# Patient Record
Sex: FEMALE | Race: WHITE | Employment: OTHER | URBAN - METROPOLITAN AREA
[De-identification: names, ages, dates, MRNs, and addresses within clinical notes are randomized per-mention and may not be internally consistent; named-entity substitution may affect disease eponyms.]

---

## 2017-08-17 ENCOUNTER — TELEPHONE (OUTPATIENT)
Dept: DERMATOLOGY | Facility: AMBULATORY SURGERY CENTER | Age: 77
End: 2017-08-17

## 2017-08-17 NOTE — TELEPHONE ENCOUNTER
Patient Appointment Date: 8/31/17      Janet Pete, 78 y.o., female  Is calling for their Mohs Pre-Op Assessment    does not have Hepatitis C or HI  does confirm site   does ID site. Allergies: Allergies not on file      does not have a Pacemaker  does not have a Defibrillator    does not need antibiotics    is not taking NSAIDs    is not taking aspirin    is not taking garlic  is not taking ginkgo  is not taking Ginseng  is not taking fish oils  is not taking vit E    does not take a blood thinner    is not taking Coumadin/Warfarin     Pre operative assessment questions asked to patient. Patient has a general understanding of the procedure, and has been versed that there will be local anesthesia used in the procedure and that She will be ok to drive themselves to and from the appointment.

## 2017-08-31 ENCOUNTER — OFFICE VISIT (OUTPATIENT)
Dept: DERMATOLOGY | Facility: AMBULATORY SURGERY CENTER | Age: 77
End: 2017-08-31

## 2017-08-31 VITALS
SYSTOLIC BLOOD PRESSURE: 128 MMHG | HEIGHT: 70 IN | RESPIRATION RATE: 20 BRPM | HEART RATE: 88 BPM | TEMPERATURE: 97 F | WEIGHT: 175 LBS | DIASTOLIC BLOOD PRESSURE: 72 MMHG | BODY MASS INDEX: 25.05 KG/M2 | OXYGEN SATURATION: 95 %

## 2017-08-31 DIAGNOSIS — C44.311 BASAL CELL CARCINOMA OF LEFT NASAL TIP: Primary | ICD-10-CM

## 2017-08-31 RX ORDER — LIDOCAINE HYDROCHLORIDE AND EPINEPHRINE 20; 5 MG/ML; UG/ML
2.5 INJECTION, SOLUTION EPIDURAL; INFILTRATION; INTRACAUDAL; PERINEURAL ONCE
Qty: 2.5 ML | Refills: 0
Start: 2017-08-31 | End: 2017-08-31

## 2017-08-31 RX ORDER — BUPIVACAINE HYDROCHLORIDE AND EPINEPHRINE 2.5; 5 MG/ML; UG/ML
INJECTION, SOLUTION INFILTRATION; PERINEURAL
Qty: 1.5 ML | Refills: 0
Start: 2017-08-31

## 2017-08-31 NOTE — MR AVS SNAPSHOT
Visit Information Date & Time Provider Department Dept. Phone Encounter #  
 8/31/2017 10:30 AM Stephanie Wilkins MD [facility] [id number] Your Appointments 10/4/2017  8:45 AM  
SURGERY with Stephanie Wilkins MD  
[facility]) Appt Note: NP BCC left nasal tip Dr. Gisell Mauro mailed ppw  
 [facility] [place] [address]  
[address] [place] [address] [address] [place] [address] Upcoming Health Maintenance Date Due DTaP/Tdap/Td series (1 - Tdap) 12/8/1963 FOBT Q 1 YEAR AGE 50-75 12/8/1992 ZOSTER VACCINE AGE 60> 10/8/2002 GLAUCOMA SCREENING Q2Y 12/8/2007 OSTEOPOROSIS SCREENING (DEXA) 12/8/2007 Pneumococcal 65+ Low/Medium Risk (1 of 2 - PCV13) 12/8/2007 MEDICARE YEARLY EXAM 12/8/2007 INFLUENZA AGE 9 TO ADULT 8/1/2017 Allergies as of 8/31/2017  Never Reviewed Not on File Current Immunizations  Never Reviewed No immunizations on file. Not reviewed this visit You Were Diagnosed With   
  
 Codes Comments Basal cell carcinoma of left nasal tip    -  Primary ICD-10-CM: C44.311 ICD-9-CM: 173.31 Vitals BP Pulse Temp Resp Height(growth percentile) Weight(growth percentile) 128/72 (BP 1 Location: Left arm, BP Patient Position: Sitting) 88 97 °F (36.1 °C) (Oral) 20 5' 10\" (1.778 m) 175 lb (79.4 kg) SpO2 BMI Smoking Status 95% 25.11 kg/m2 Never Assessed BMI and BSA Data Body Mass Index Body Surface Area  
 25.11 kg/m 2 1.98 m 2 Your Updated Medication List  
  
Notice  As of 8/31/2017 11:17 AM  
 You have not been prescribed any medications. Patient Instructions WOUND CARE INSTRUCTIONS 1. Keep the dressing clean and dry and do not remove for 48 hours. 2. Then change the dressing once a day as follows: a. Wash hands before and after each dressing change. b. Remove dressing and wash site gently with mild soap and water, rinse, and pat dry. 
c. Apply an ointment (Bacitracin, Polysporin, Neosporin, Petroleum jelly or Aquaphor). d. Apply a non-stick (Telfa) dressing or Band-Aid to cover the wound. Remove pressure bandage on Saturday, then wash gently and apply Vaseline and a band-aid to site daily for 1 week. 3. Watch for: BLEEDING: A small amount of drainage may occur. If bleeding occurs, elevate and rest the surgery site. Apply gauze and steady pressure for 15 minutes. If bleeding continues, call this office. INFECTION: Signs of infection include increased redness, pain, warmth, drainage of pus, and fever. If this occurs, call this office. 4. Special Instructions (follow any that are checked): ·  You have stitches that need to be removed in 0 days ·  Avoid bending at the waist and heavy lifting for two days. ·  Sleep with your head elevated for the next two nights. ·  Rest the surgery site and keep it elevated as much as possible for two days. ·  You may apply an ice-pack for 10-15 minutes every waking hour for the rest of the day. ·  Eat a soft diet and avoid hot food and hot drinks for the rest of the day. ·  Other instructions: Follow up as directed Take Tylenol for pain as needed. Once the site is healed with no remaining bandages or open areas, protect your surgical site and scar from the sun, as this area will be more sensitive. Use a broad spectrum sunscreen SPF 30 or higher daily, and a chemical free product (one containing zinc oxide or titanium dioxide) is a good choice if the area is sensitive. You may begin to gently massage the surgical site in 2-3 weeks, rubbing in a circular motion along the scar. This can help reduce swelling and thickness of a scar. A scar cream may be used beginnning 1 month after the surgery. If you have any questions or concerns, please call our office Monday through Friday at [phone]. Introducing [facility]! [facility] introduces [employer] patient portal. Now you can access parts of your medical record, email your doctor's office, and request medication refills online. 1. In your internet browser, go to [email or web address] 2. Click on the First Time User? Click Here link in the Sign In box. You will see the New Member Sign Up page. 3. Enter your [employer] Access Code exactly as it appears below. You will not need to use this code after youve completed the sign-up process. If you do not sign up before the expiration date, you must request a new code. · [employer] Access Code: [email or web address] Expires: 11/29/2017 11:16 AM 
 
4. Enter the last four digits of your Social Security Number (xxxx) and Date of Birth (mm/dd/yyyy) as indicated and click Submit. You will be taken to the next sign-up page. 5. Create a [employer] ID. This will be your [employer] login ID and cannot be changed, so think of one that is secure and easy to remember. 6. Create a [employer] password. You can change your password at any time. 7. Enter your Password Reset Question and Answer. This can be used at a later time if you forget your password. 8. Enter your e-mail address. You will receive e-mail notification when new information is available in [address]. 9. Click Sign Up. You can now view and download portions of your medical record. 10. Click the Download Summary menu link to download a portable copy of your medical information. If you have questions, please visit the Frequently Asked Questions section of the [employer] website. Remember, [employer] is NOT to be used for urgent needs. For medical emergencies, dial 911. Now available from your iPhone and Android! Please provide this summary of care documentation to your next provider. If you have any questions after today's visit, please call [phone].

## 2017-08-31 NOTE — PATIENT INSTRUCTIONS
WOUND CARE INSTRUCTIONS    1. Keep the dressing clean and dry and do not remove for 48 hours. 2. Then change the dressing once a day as follows:  a. Wash hands before and after each dressing change. b. Remove dressing and wash site gently with mild soap and water, rinse, and pat dry.  c. Apply an ointment (Bacitracin, Polysporin, Neosporin, Petroleum jelly or Aquaphor). d. Apply a non-stick (Telfa) dressing or Band-Aid to cover the wound. Remove pressure bandage on Saturday, then wash gently and apply Vaseline and a band-aid to site daily for 1 week. 3. Watch for:  BLEEDING: A small amount of drainage may occur. If bleeding occurs, elevate and rest the surgery site. Apply gauze and steady pressure for 15 minutes. If bleeding continues, call this office. INFECTION: Signs of infection include increased redness, pain, warmth, drainage of pus, and fever. If this occurs, call this office. 4. Special Instructions (follow any that are checked):  · [] You have stitches that need to be removed in 0 days  · [x] Avoid bending at the waist and heavy lifting for two days. · [x] Sleep with your head elevated for the next two nights. · [x] Rest the surgery site and keep it elevated as much as possible for two days. · [x] You may apply an ice-pack for 10-15 minutes every waking hour for the rest of the day. · [] Eat a soft diet and avoid hot food and hot drinks for the rest of the day. · [] Other instructions: Follow up as directed  Take Tylenol for pain as needed. Once the site is healed with no remaining bandages or open areas, protect your surgical site and scar from the sun, as this area will be more sensitive. Use a broad spectrum sunscreen SPF 30 or higher daily, and a chemical free product (one containing zinc oxide or titanium dioxide) is a good choice if the area is sensitive. You may begin to gently massage the surgical site in 2-3 weeks, rubbing in a circular motion along the scar.  This can help reduce swelling and thickness of a scar. A scar cream may be used beginnning 1 month after the surgery. If you have any questions or concerns, please call our office Monday through Friday at [phone].

## 2017-08-31 NOTE — PROGRESS NOTES
This note is written by Sarah Mustafa, as dictated by Salvatore Landeros. Jerrell Juarez MD.    CC: Basal cell carcinoma on the left nasal tip     History of present illness:     Philippe Kennedy is a 78 y.o. female referred by Dr. Jonathan Cobos. She has a biopsy-proven infiltrative basal cell carcinoma on the left nasal tip. This is a new basal cell carcinoma present for three to four months described as a non healing pimple like lesion with no prior treatment. Biopsy confirmed the diagnosis of basal cell carcinoma, and I reviewed the written pathology. She is feeling well and in her usual state of health today. She has no pain, no current illnesses, no other skin concerns. Her allergies, medications, medical, and social history are reviewed by me today. Exam:     She is an awake, alert, and oriented 78 y.o. female who appears well and in no distress. There is no preauricular, submandibular, or cervical lymphadenopathy. I examined her nose. She has an 11 x 11 mm eroded papule on her left nasal tip. She confirms location. Assessment/plan:    1. Basal cell carcinoma, left nasal tip. I discussed the diagnosis of basal cell carcinoma and summarized the pathology report. Mohs surgery is indicated by site, size, and histology. The procedure was discussed, verbal and written consent were obtained. I performed the procedure. One stage was required to reach a tumor free plane. We discussed the option of plastic surgery reconstruction to manage the surgical defect. She elected to have to surgical defect managed by me. The surgical defect was managed with an [address] advancement flap. There were no complications. She will follow up in one week for a wound check and as needed as the site heals. Indications, risks, and options were discussed with Philippe Kennedy preoperatively. Risks including, but not limited to: pain, bleeding, infection, tumor recurrence, scarring and damage to motor and/or sensory nerves, were discussed. Zainab Simeon chose Mohs surgery. Zainab Simeon was an acceptable surgery candidate. Zainab Simeon was placed in the appropriate position on the operating table in the Mohs surgery procedure room. The area was prepped and draped in the standard manner. Gentian violet was used to outline the clinical margins of the tumor. Local anesthesia was then obtained. The grossly visible tumor was then removed, an underlying layer was excised and mapped according to the Mohs technique, and the individual specimens examined microscopically. The process was repeated until microscopic examination of the tissue specimens confirmed a tumor-free plane. Hemostasis was obtained with electrosurgery and pressure. The wound was covered between stages with moist saline gauze. The wound management options of second intent healing, layered closure, local flap, or full thickness skin graft were discussed. Ms. Eduard Montesinos understands the aims, risks, alternatives, and possible complications and elects to proceed with an [address] advancement flap. Ms. Eduard Montesinos was placed in a supine position on the operating table in the Mohs surgery procedure room. The area was prepped and draped in the standard manner. Gentian violet was used to outline the proposed flap.  2% lidocaine with epinephrine 1:100,000 was used to supplement the already existing anesthesia. The wound penetrated to the subcutaneous layer and measured 15 x 13 mm. The wound margins were undermined in the subcutaneous plane. The flap was advanced into place. Hemostasis was obtained with spot electrocoagulation. 5-0 polysorb sutures were used to approximate the deep tissues and 5-0 fast gut sutures were used to approximate the skin edges. A pressure dressing utilizing Vaseline, Telfa, gauze and Coverroll was placed. Wound care instructions (written and verbal) and a follow up appointment were given to the patient before discharge.   Ms. Eduard Montesinos was discharged in good condition. 2. History of non melanoma skin cancer. I discussed the diagnosis and recommend routine examinations with Dr. Eduardo Penaloza for surveillance. The documentation recorded by the scribe accurately reflects the service I personally performed and the decisions made by me. [facility]   OFFICE PROCEDURE PROGRESS NOTE     Chart reviewed for the following:     Naomi López MD, have reviewed the History, Physical and updated the Allergic reactions for [email or web address] performed immediately prior to start of procedure:     Naomi López MD, have performed the following reviews on Taylor Ross prior to the start of the procedure:     * Patient was identified by name and date of birth   * Agreement on procedure being performed was verified   * Risks and Benefits explained to the patient   * Procedure site verified and marked as necessary   * Patient was positioned for comfort   * Consent was signed and verified     Time: 10:30 AM   Date of procedure: 8/31/2017  Procedure performed by: Svitlana Puentes.  Nilay López MD   Provider assisted by: LPN   Patient assisted by: self   How tolerated by patient: tolerated the procedure well with no complications   Comments: none

## 2017-08-31 NOTE — PROGRESS NOTES
Pre-op: Patient present today for the evaluation of BCC to the Left nasal tip. Procedure explained with full understanding. Vitals:    08/31/17 0945   BP: 128/72   Pulse: 88   Resp: 20   Temp: 97 °F (36.1 °C)   TempSrc: Oral   SpO2: 95%   Weight: 79.4 kg (175 lb)   Height: 5' 10\" (1.778 m)     preoperatively, will continue to monitor. Post-op: Written and verbal post-op wound care instructions given to patient with full understanding of care. Surgical wound bandaged with Vaseline, Telfa, 2x2 gauze, and coverall tape. All questions and concerns addressed. Vitals stable postoperatively.

## 2017-09-01 ENCOUNTER — TELEPHONE (OUTPATIENT)
Dept: DERMATOLOGY | Facility: AMBULATORY SURGERY CENTER | Age: 77
End: 2017-09-01

## 2017-09-01 NOTE — TELEPHONE ENCOUNTER
Spoke with patient after verifying name and  regarding Post-Op. Patient stated that she had some pain on Thursday but today patient is doing fine. Patient given an opportunity to ask questions, repeated information, and verbalized understanding.

## 2017-09-07 ENCOUNTER — OFFICE VISIT (OUTPATIENT)
Dept: DERMATOLOGY | Facility: AMBULATORY SURGERY CENTER | Age: 77
End: 2017-09-07

## 2017-09-07 DIAGNOSIS — Z85.828 HISTORY OF BASAL CELL CARCINOMA EXCISION: Primary | ICD-10-CM

## 2017-09-07 DIAGNOSIS — Z98.890 HISTORY OF BASAL CELL CARCINOMA EXCISION: Primary | ICD-10-CM

## 2017-09-07 NOTE — PROGRESS NOTES
This note was written by Chalo Durand, as dictated by Brenna Urena MD.     Wound check/suture removal:    Chief complaint: wound check. HPI: Zainab Simeon presents for wound check following Mohs surgery performed by me on 08/31/17. She had a basal cell carcinoma on her left nasal tip. The surgical site was managed with an [address] advancement flap. She reports itching but little discomfort. Exam: The surgical site was examined. There is not evidence of infection. There is not mild expected erythema. There is not edema. Nasal contour is excellent. A/P:  Wound check. The surgical site is healing well. Additional care was reviewed. I recommended the use of Vaseline for the next three days. Follow up will be as needed. The documentation recorded by the scribe accurately reflects the service I personally performed and the decisions made by me.

## 2018-08-30 ENCOUNTER — HOSPITAL ENCOUNTER (OUTPATIENT)
Dept: PREADMISSION TESTING | Age: 78
Discharge: HOME | End: 2018-08-30
Payer: MEDICARE

## 2018-08-30 VITALS
RESPIRATION RATE: 20 BRPM | WEIGHT: 177 LBS | HEIGHT: 70 IN | TEMPERATURE: 98 F | SYSTOLIC BLOOD PRESSURE: 148 MMHG | BODY MASS INDEX: 25.34 KG/M2 | HEART RATE: 92 BPM | DIASTOLIC BLOOD PRESSURE: 67 MMHG

## 2018-08-30 LAB
ATRIAL RATE: 80 BPM
BASOPHILS # BLD: 0.1 K/UL (ref 0–0.1)
BASOPHILS NFR BLD: 1 % (ref 0–1)
CALCULATED P AXIS, ECG09: 77 DEGREES
CALCULATED R AXIS, ECG10: 97 DEGREES
CALCULATED T AXIS, ECG11: 47 DEGREES
DIAGNOSIS, 93000: NORMAL
DIFFERENTIAL METHOD BLD: ABNORMAL
EOSINOPHIL # BLD: 0.3 K/UL (ref 0–0.4)
EOSINOPHIL NFR BLD: 4 % (ref 0–7)
ERYTHROCYTE [DISTWIDTH] IN BLOOD BY AUTOMATED COUNT: 16.2 % (ref 11.5–14.5)
HCT VFR BLD AUTO: 36.2 % (ref 35–47)
HGB BLD-MCNC: 10.4 G/DL (ref 11.5–16)
IMM GRANULOCYTES # BLD: 0.1 K/UL (ref 0–0.04)
IMM GRANULOCYTES NFR BLD AUTO: 1 % (ref 0–0.5)
LYMPHOCYTES # BLD: 1.5 K/UL (ref 0.8–3.5)
LYMPHOCYTES NFR BLD: 22 % (ref 12–49)
MCH RBC QN AUTO: 24.5 PG (ref 26–34)
MCHC RBC AUTO-ENTMCNC: 28.7 G/DL (ref 30–36.5)
MCV RBC AUTO: 85.2 FL (ref 80–99)
MONOCYTES # BLD: 0.6 K/UL (ref 0–1)
MONOCYTES NFR BLD: 9 % (ref 5–13)
NEUTS SEG # BLD: 4.3 K/UL (ref 1.8–8)
NEUTS SEG NFR BLD: 63 % (ref 32–75)
NRBC # BLD: 0 K/UL (ref 0–0.01)
NRBC BLD-RTO: 0 PER 100 WBC
P-R INTERVAL, ECG05: 166 MS
PLATELET # BLD AUTO: 180 K/UL (ref 150–400)
PLATELET COMMENTS,PCOM: ABNORMAL
PMV BLD AUTO: 11.9 FL (ref 8.9–12.9)
Q-T INTERVAL, ECG07: 414 MS
QRS DURATION, ECG06: 126 MS
QTC CALCULATION (BEZET), ECG08: 477 MS
RBC # BLD AUTO: 4.25 M/UL (ref 3.8–5.2)
RBC MORPH BLD: ABNORMAL
VENTRICULAR RATE, ECG03: 80 BPM
WBC # BLD AUTO: 6.9 K/UL (ref 3.6–11)

## 2018-08-30 PROCEDURE — 85025 COMPLETE CBC W/AUTO DIFF WBC: CPT | Performed by: ORTHOPAEDIC SURGERY

## 2018-08-30 PROCEDURE — 93005 ELECTROCARDIOGRAM TRACING: CPT

## 2018-08-30 PROCEDURE — 36415 COLL VENOUS BLD VENIPUNCTURE: CPT | Performed by: ORTHOPAEDIC SURGERY

## 2018-08-30 RX ORDER — CHOLECALCIFEROL (VITAMIN D3) 125 MCG
CAPSULE ORAL AS NEEDED
COMMUNITY

## 2018-08-30 RX ORDER — INSULIN LISPRO 100 [IU]/ML
INJECTION, SOLUTION INTRAVENOUS; SUBCUTANEOUS
COMMUNITY

## 2018-08-30 RX ORDER — LOSARTAN POTASSIUM 25 MG/1
25 TABLET ORAL DAILY
COMMUNITY

## 2018-08-30 RX ORDER — ACETAMINOPHEN 500 MG
1000 TABLET ORAL
COMMUNITY

## 2018-08-30 RX ORDER — METFORMIN HYDROCHLORIDE 1000 MG/1
1000 TABLET ORAL 2 TIMES DAILY
COMMUNITY

## 2018-08-30 RX ORDER — GLIMEPIRIDE 4 MG/1
4 TABLET ORAL 2 TIMES DAILY
COMMUNITY

## 2018-08-30 RX ORDER — ESCITALOPRAM OXALATE 10 MG/1
10 TABLET ORAL
COMMUNITY